# Patient Record
(demographics unavailable — no encounter records)

---

## 2025-06-19 NOTE — HISTORY OF PRESENT ILLNESS
[de-identified] : Last Visit: Feb 20 2024 Reason: Right knee pain new xrays  Symptoms: throbbing and stabbing  Pain Level: 8/10 Physical therapy/ Home exercises n/a

## 2025-06-19 NOTE — PHYSICAL EXAM
[de-identified] : Right knee  Constitutional:  The patient is healthy-appearing and in no apparent distress.   Gait: The patient ambulates with a normal gait and no limp.  Cardiovascular System:  The capillary refill is less than 2 seconds.   Skin:  There are no skin abnormalities.  Right Knee:   Bony Palpation:  There is no tenderness of the medial joint line.  There is no tenderness of the lateral joint line. There is tenderness of the medial femoral chondyle. There is tenderness of the lateral femoral chondyle. There is no tenderness of the tibial tubercle. There is no tenderness of the superior patella. There is no tenderness of the inferior patella. There is tenderness of the medial patellar facet. There is tenderness of the lateral patellar facet.  Soft Tissue Palpation:  There is no tenderness of the medial retinaculum. There is no tenderness of the lateral retinaculum. There is no tenderness of the quadriceps tendon. There is no tenderness of the patella tendon. There is no tenderness of the ITB. There is no tenderness of the pes anserine.  Active Range of Motion:  The range of motion at the knee actively and passively is 0 - 120  Special Tests:  There is a negative Apley. There is a negative Steinmanns.  There is a negative Lachman and Anterior Drawer. There is a negative Posterior Drawer.   There is no varus or valgus laxity.  Strength:  There is 5/5 hip flexion and 5/5 knee flexion and extension.    Psychiatric:  The patient demonstrates a normal mood and affect and is active and alert   [de-identified] : Based on detailed discussion with history and physical examination of the patient, x-ray evaluation is recommended and ordered for treatment and diagnosis. X-ray right knee 3 view: There is severe lateral and mild to moderate patellofemoral and medial compartment arthritis

## 2025-06-19 NOTE — PROCEDURE

## 2025-06-19 NOTE — ASSESSMENT
[FreeTextEntry1] : Discussed at length patient exam history and imaging and severity arthritis he understands and ultimately may benefit from total knee replacement this time he elects home exercise PT anti-inflammatory as well as cortisone injection